# Patient Record
Sex: MALE | Race: WHITE | Employment: OTHER | URBAN - METROPOLITAN AREA
[De-identification: names, ages, dates, MRNs, and addresses within clinical notes are randomized per-mention and may not be internally consistent; named-entity substitution may affect disease eponyms.]

---

## 2019-07-18 ENCOUNTER — HOSPITAL ENCOUNTER (OUTPATIENT)
Dept: WOUND CARE | Age: 84
Discharge: HOME | End: 2019-07-18
Payer: MEDICARE

## 2019-07-18 VITALS
SYSTOLIC BLOOD PRESSURE: 151 MMHG | TEMPERATURE: 97.8 F | RESPIRATION RATE: 16 BRPM | HEART RATE: 62 BPM | DIASTOLIC BLOOD PRESSURE: 73 MMHG

## 2019-07-18 PROBLEM — L22 DIAPER DERMATITIS: Status: ACTIVE | Noted: 2019-07-18

## 2019-07-18 PROBLEM — L89.322 STAGE II PRESSURE ULCER OF LEFT BUTTOCK (HCC): Status: ACTIVE | Noted: 2019-07-18

## 2019-07-18 PROBLEM — L89.312 STAGE II PRESSURE ULCER OF RIGHT BUTTOCK (HCC): Status: ACTIVE | Noted: 2019-07-18

## 2019-07-18 PROCEDURE — 74011000250 HC RX REV CODE- 250: Performed by: OTOLARYNGOLOGY

## 2019-07-18 PROCEDURE — 99215 OFFICE O/P EST HI 40 MIN: CPT

## 2019-07-18 RX ORDER — MIRTAZAPINE 15 MG/1
TABLET, FILM COATED ORAL
COMMUNITY

## 2019-07-18 RX ORDER — OXYCODONE AND ACETAMINOPHEN 7.5; 325 MG/1; MG/1
1 TABLET ORAL
COMMUNITY

## 2019-07-18 RX ORDER — ESCITALOPRAM OXALATE 5 MG/1
TABLET ORAL DAILY
COMMUNITY

## 2019-07-18 RX ORDER — FINASTERIDE 5 MG/1
5 TABLET, FILM COATED ORAL DAILY
COMMUNITY

## 2019-07-18 RX ORDER — MELATONIN
DAILY
COMMUNITY

## 2019-07-18 RX ORDER — LANOLIN ALCOHOL/MO/W.PET/CERES
1000 CREAM (GRAM) TOPICAL DAILY
COMMUNITY

## 2019-07-18 RX ORDER — ASPIRIN 81 MG/1
81 TABLET ORAL DAILY
COMMUNITY

## 2019-07-18 RX ADMIN — Medication: at 11:00

## 2019-07-18 NOTE — WOUND CARE
07/18/19 1120   Wound Buttocks Right   Date First Assessed/Time First Assessed: 07/18/19 1027   Primary Wound Type: Pressure Injury  Location: Buttocks  Wound Location Orientation: Right   Dressing Type Applied Alginate; Foam   Procedure Tolerated Well   Wound Buttocks Left   Date First Assessed/Time First Assessed: 07/18/19 1028   Primary Wound Type: Pressure Injury  Location: Buttocks  Wound Location Orientation: Left   Dressing Type Applied Alginate; Foam   Procedure Tolerated Well   Discharge Condition:stable  Ambulatory Status:wheelchair  Discharge Destination:home  Transportation: facility transport  Accompanied by: self

## 2019-07-18 NOTE — H&P
[address] [place]   HISTORY AND PHYSICAL    Name:  Javy Rodriguez  MR#:  [id number]  :  1926  ACCOUNT #:  [de-identified]  ADMIT DATE:  2019      HISTORY OF PRESENT ILLNESS:  The patient is a 61-year-old male who presents with a 6-week history of bilateral buttocks wounds. Some unknown ointment has been applied followed by a foam dressing. He feels that the wounds have not improved over time. HABITS:  He discontinued cigarettes in . He denies alcohol. ALLERGIES:  HE HAS NO KNOWN DRUG ALLERGIES. CURRENT MEDICATIONS:  Lanoxin, amiodarone, metformin/Glucophage, tamsulosin, levothyroxine, warfarin, Proscar. PAST MEDICAL HISTORY:  Type 2 diabetes, benign prostatic hypertrophy, hypothyroidism. FAMILY HISTORY:  Negative other than for coronary artery heart disease. PHYSICAL EXAMINATION:  GENERAL:  The patient is awake, alert, and conversant. He ambulates with the use of an electrical wheelchair. He spends most of his days in that vehicle. VITAL SIGNS:  His temperature is 97.8, pulse 62, respirations 16, blood pressure 151/73. NEUROLOGIC:  Cranial nerves II through XII grossly intact but does wear bilateral hearing aids. HEAD AND NECK:  Oral cavity, oropharynx, and neck are normal.  LUNGS:  Clear to auscultation and percussion. HEART:  Regular rate and rhythm without murmur. ABDOMEN:  Soft and nontender. BACK:  Nontender to palpation. UPPER EXTREMITIES:  Equal tone and strength bilaterally. LOWER EXTREMITIES:  No lesions noted. Examination of the buttocks reveals a right buttocks wound of 0.5 x 0.5 x 0.1 cm and a left buttocks wound of 0.3 x 0.3 x 0.1 cm. Both wounds are clean. There is no erythema. They are moist, but they are both stage II pressure ulcers.     ASSESSMENT AND PLAN:  I explained to the patient that the biggest cause of his problem is the fact that he sits so long in his electric chair and in his reclining chair and is therefore developing pressure ulcers in this area. In addition, he does have urinary incontinence at times. Therefore, since he has home health on Mondays, Wednesdays, and Fridays, we are going to try to find some dressing that will work that can be changed every few days. We are going to start with Aquacel Ag followed by a bordered foam to be changed Mondays, Wednesdays, and Fridays. I have encouraged the patient to use an air waffle pad with minimal air in it to keep him off of the firm chair surface upon which he has been sitting. It will also be covered with a fabric such as a pillowcase or a towel. I have encouraged him to lie in bed on either his left side, right side, or in a prone position, so that if he does have incontinence, he will not bathe the area of concern in urine. I will see him again in followup in 2 weeks. Hopefully, we can get him healed quickly with this conservative regimen. All questions were answered. His condition on discharge is stable.         Byrno Cervantes MD      [email or web address]  D:  07/18/2019 11:09  T:  07/18/2019 11:17  JOB #:  [id number]  CC:  Zan Toth MD

## 2019-07-18 NOTE — WOUND CARE
07/18/19 1027   Wound Buttocks Right   Date First Assessed/Time First Assessed: 07/18/19 1027   Primary Wound Type: Pressure Injury  Location: Buttocks  Wound Location Orientation: Right   Dressing Status Removed   Dressing Type Foam   Pressure Injury Stage 2   Wound Length (cm) 0.5 cm   Wound Width (cm) 0.5 cm   Wound Depth (cm) 0.1 cm   Wound Volume (cm^3) 0.02 cm^3   Condition of Base Pink   Drainage Amount Scant   Drainage Color Serosanguinous   Wound Odor None   Izzy-wound Assessment Intact   Cleansing and Cleansing Agents  Normal saline   Wound Buttocks Left   Date First Assessed/Time First Assessed: 07/18/19 1028   Primary Wound Type: Pressure Injury  Location: Buttocks  Wound Location Orientation: Left   Dressing Status Removed   Dressing Type Foam   Pressure Injury Stage 2   Wound Length (cm) 0.3 cm   Wound Width (cm) 0.3 cm   Wound Depth (cm) 0.1 cm   Wound Volume (cm^3) 0.01 cm^3   Condition of Base Pink   Drainage Amount Scant   Drainage Color Serosanguinous   Wound Odor None   Izzy-wound Assessment Intact   Cleansing and Cleansing Agents  Normal saline

## 2019-08-08 ENCOUNTER — HOSPITAL ENCOUNTER (OUTPATIENT)
Dept: WOUND CARE | Age: 84
Discharge: HOME | End: 2019-08-08
Payer: MEDICARE

## 2019-08-08 VITALS
HEART RATE: 63 BPM | DIASTOLIC BLOOD PRESSURE: 72 MMHG | TEMPERATURE: 98 F | RESPIRATION RATE: 16 BRPM | SYSTOLIC BLOOD PRESSURE: 150 MMHG

## 2019-08-08 DIAGNOSIS — L22 DIAPER DERMATITIS: ICD-10-CM

## 2019-08-08 DIAGNOSIS — L89.322 STAGE II PRESSURE ULCER OF LEFT BUTTOCK (HCC): ICD-10-CM

## 2019-08-08 DIAGNOSIS — L89.312 STAGE II PRESSURE ULCER OF RIGHT BUTTOCK (HCC): ICD-10-CM

## 2019-08-08 PROCEDURE — 99214 OFFICE O/P EST MOD 30 MIN: CPT

## 2019-08-08 NOTE — DISCHARGE INSTRUCTIONS
Discharge Instructions for  [address] [facility], [address] [place] [address] Avenue  Telephone: [phone]    NAME:  Haroldo Rodriguez  YOB: 1926  MEDICAL RECORD NUMBER:  [id number]  DATE:  8/8/2019    Wound Cleansing:   Do not scrub or use excessive force. Cleanse wound prior to applying a clean dressing with:  [] Normal Saline [] Keep Wound Dry in Shower    [] Wound Cleanser   [] Cleanse wound with Mild Soap & Water  [x] May Shower at Discharge   [] Other:       Topical Treatments:  Do not apply lotions, creams, or ointments to wound bed unless directed. [] Apply moisturizing lotion to skin surrounding the wound prior to dressing change.  [] Apply antifungal ointment to skin surrounding the wound prior to dressing change.  [] Apply thin film of moisture barrier ointment to skin immediately around wound. [x] Other:   Apply desitin ointment to healed wound area 1-2 times per day, dry skin thoroughly before application of cream       Pressure Relief:  [x] When sitting, shift position or do seat lifts every 15 minutes. [x] Wheelchair cushion( Continue to use waffle cushoin covered with towel or pillow case) [] Specialty Bed/Mattress  [x] Turn every 2 hours when in bed. Avoid position directing pressure on wound site. Limit side lying to 30 degree tilt. Limit HOB elevation to 30 degrees. [] Elevate arm/hand above the level of the heart []RightArm []LeftArm     Dietary:  [x] Diet as tolerated: [] Calorie Diabetic Diet: [] No Added Salt:  [x] Increase Protein: [] Other:   Activity:  [x] Activity as tolerated:  [] Patient has no activity restrictions     [] Strict Bedrest: [] Remain off Work:     [] May return to full duty work:                                   [] Return to work with restrictions:             Return Appointment:  [] Wound and dressing supply provider:   [x] ECF or Home Healthcare:  At [address] [place] [address] ( Wound care no longer needed)  [] Wound Assessment: [] Physician or NP scheduled for Wound Assessment:   [x] Return Appointment:As needed  [] Ordered tests:      Electronically signed on 8/8/2019 at 10:48 AM     Leidy Cardona 281: Should you experience any significant changes in your wound(s) or have questions about your wound care, please contact the [address] at [address] [place] [address] 8:00 am - 4:30. If you need help with your wound outside these hours and cannot wait until we are again available, contact your PCP or go to the hospital emergency room. PLEASE NOTE: IF YOU ARE UNABLE TO OBTAIN WOUND SUPPLIES, CONTINUE TO USE THE SUPPLIES YOU HAVE AVAILABLE UNTIL YOU ARE ABLE TO REACH US. IT IS MOST IMPORTANT TO KEEP THE WOUND COVERED AT ALL TIMES.      Physician Signature:_______________________    Date: ___________ Time:  ____________

## 2019-08-08 NOTE — WOUND CARE
08/08/19 1027 Wound Buttocks Right Date First Assessed/Time First Assessed: 07/18/19 1027   Primary Wound Type: Pressure Injury  Location: Buttocks  Wound Location Orientation: Right Dressing Status Removed Dressing Type Foam  
Pressure Injury Stage 3 Wound Length (cm) 0.1 cm Wound Width (cm) 0.1 cm Wound Depth (cm) 0.1 cm Wound Volume (cm^3) 0 cm^3 Condition of Edges Closed Drainage Amount None Wound Odor None Izzy-wound Assessment Red; Intact Cleansing and Cleansing Agents  Normal saline Visit Vitals /72 Pulse 63 Temp 98 °F (36.7 °C) Resp 16

## 2019-08-08 NOTE — PROGRESS NOTES
[address] [place] [address] PROGRESS NOTE    Name:  Isi Moraes  MR#:  [id number]  :  1926  ACCOUNT #:  [de-identified]  DATE OF SERVICE:  2019      SUBJECTIVE:  The patient returns for treatment of diaper dermatitis, L22, with wounds of both buttocks. He was last seen on . We treated him with Aquacel Ag, Bordered Foam, and instructions to purchase an air waffle pad and to cover it with fabric and to offload as well as possible. The patient has had no problems since last seen and denies any changes in medications, allergies, or review of systems. OBJECTIVE:  VITAL SIGNS:  His temperature is 98, pulse 63, respirations 16, blood pressure 150/72. WOUND EXAMINATION:  Examination showed the patient has completely healed all open wounds. ASSESSMENT AND PLAN:  The patient has done beautifully. I showed him that he had too much air in his air waffle pad. I let out enough air so that he was comfortable and was able to sit only half an inch above the surface of his wheelchair. He is going to continue to use the pad and cover it with fabric. He will continue to offload as well as possible. He will start using Desitin at least twice a day, but also as needed after every bowel movement or if the area gets moist from urine. We will simply see him again on a p.r.n. basis and hopefully by keeping everything clean and comfortable, he will stay healed.         MD TAWANDA [email or web address]  D:  2019 11:15  T:  2019 11:23  JOB #:  [id number]

## 2019-08-13 ENCOUNTER — OFFICE VISIT (OUTPATIENT)
Dept: URGENT CARE | Age: 84
End: 2019-08-13

## 2019-08-13 VITALS
RESPIRATION RATE: 20 BRPM | SYSTOLIC BLOOD PRESSURE: 136 MMHG | BODY MASS INDEX: 18.34 KG/M2 | OXYGEN SATURATION: 96 % | WEIGHT: 131 LBS | DIASTOLIC BLOOD PRESSURE: 63 MMHG | HEART RATE: 76 BPM | HEIGHT: 71 IN | TEMPERATURE: 97.7 F

## 2019-08-13 DIAGNOSIS — T14.8XXA MULTIPLE SKIN TEARS: Primary | ICD-10-CM

## 2019-08-13 NOTE — PROGRESS NOTES
Pt got caught in an electric door yesterday while on his scooter and sustained BL forearm skin tears    The history is provided by the patient. Skin Problem   This is a new problem. The current episode started yesterday. The problem occurs constantly. The problem has not changed since onset. Nothing aggravates the symptoms. Nothing relieves the symptoms. Treatments tried: cleaning and dressing.         Past Medical History:   Diagnosis Date    Arrhythmia     a fib    Arthritis     RA    Asthma     's    Autoimmune disease (HCC)     RA    Cancer (HCC)     basal cell on face    Chronic pain     from arthritis    Diabetes (HCC)     Other ill-defined conditions(799.89)     BPH    Pacemaker     Thyroid disease     hypothyroidism        Past Surgical History:   Procedure Laterality Date    CARDIAC SURG PROCEDURE UNLIST      Artificial Aortic Valve    HX APPENDECTOMY      HX CHOLECYSTECTOMY      HX OTHER SURGICAL      hemorrhoidectomy    HX PACEMAKER          HX SMALL BOWEL RESECTION           Family History   Problem Relation Age of Onset    Cancer Mother         colon    Cancer Sister         colon    Cancer Brother     Heart Disease Father         Social History     Socioeconomic History    Marital status: UNKNOWN     Spouse name: Not on file    Number of children: Not on file    Years of education: Not on file    Highest education level: Not on file   Occupational History    Not on file   Social Needs    Financial resource strain: Not on file    Food insecurity:     Worry: Not on file     Inability: Not on file    Transportation needs:     Medical: Not on file     Non-medical: Not on file   Tobacco Use    Smoking status: Former Smoker     Last attempt to quit: 1970     Years since quittin.0    Smokeless tobacco: Former User   Substance and Sexual Activity    Alcohol use: No    Drug use: No    Sexual activity: Not on file   Lifestyle    Physical activity:     Days per week: Not on file     Minutes per session: Not on file    Stress: Not on file   Relationships    Social connections:     Talks on phone: Not on file     Gets together: Not on file     Attends [employer] service: Not on file     Active member of club or organization: Not on file     Attends meetings of clubs or organizations: Not on file     Relationship status: Not on file    Intimate partner violence:     Fear of current or ex partner: Not on file     Emotionally abused: Not on file     Physically abused: Not on file     Forced sexual activity: Not on file   Other Topics Concern     Service Not Asked    Blood Transfusions Not Asked    Caffeine Concern Not Asked    Occupational Exposure Not Asked   Laron Pat Hazards Not Asked    Sleep Concern Not Asked    Stress Concern Not Asked    Weight Concern Not Asked    Special Diet Not Asked    Back Care Not Asked    Exercise Not Asked    Bike Helmet Not Asked   [address] Not Asked    Self-Exams Not Asked   Social History Narrative    Not on file                ALLERGIES: Patient has no known allergies. Review of Systems   Constitutional: Negative. Musculoskeletal: Negative. Skin: Positive for wound. Neurological: Positive for weakness. Negative for numbness. Vitals:    08/13/19 0923   BP: 111/46   Pulse: 81   Resp: 16   Temp: 97.7 °F (36.5 °C)   SpO2: 96%   Weight: 131 lb (59.4 kg)   Height: 5' 11\" (1.803 m)       Physical Exam   Constitutional: He is oriented to person, place, and time. He appears well-developed. No distress. Thin, frail and elderly but alert and cooperative NAD   HENT:   Head: Normocephalic and atraumatic. Mouth/Throat: Oropharynx is clear and moist.   Cardiovascular: Normal rate, regular rhythm and normal heart sounds. Pulmonary/Chest: Breath sounds normal. He is in respiratory distress. He has no wheezes. Neurological: He is alert and oriented to person, place, and time.    Walks with a walker   Skin: Skin is warm and dry. Laceration (skin tears ) noted. He is not diaphoretic. Skin tears BL forearms. 7cm rt FA and 6 cm left FA and then a 1cm. Cleaned with shurclens and water and patted dry and wound edges were secured with steristrips, No debridement needed, some skin edges were shrunk back or missing not allowing for full tear coverage. Pt tolerated the procedure well   Psychiatric: He has a normal mood and affect. His behavior is normal. Judgment and thought content normal.   Nursing note and vitals reviewed. MDM    Wound Repair  Date/Time: 8/13/2019 10:00 AM  Performed by: attendingPreparation: skin prepped with Shur-Clens  Pre-procedure re-eval: Immediately prior to the procedure, the patient was reevaluated and found suitable for the planned procedure and any planned medications. Time out: Immediately prior to the procedure a time out was called to verify the correct patient, procedure, equipment, staff and marking as appropriate. .  Wound length:2.6 - 7.5 cm  Foreign bodies: no foreign bodies  Skin closure: Steri-Strips  Laceration repair approximation: as close as possible. Patient tolerance: Patient tolerated the procedure well with no immediate complications  My total time at bedside, performing this procedure was 1-15 minutes. ICD-10-CM ICD-9-CM    1. Multiple skin tears T14. 8XXA 879.8      No orders of the defined types were placed in this encounter. The patients condition was discussed with the patient and they understand. The patient is to follow up with primary care doctor ,If signs and symptoms become worse the pt is to go to the ER. The patient is to take medications as prescribed.

## 2020-06-25 ENCOUNTER — HOSPITAL ENCOUNTER (OUTPATIENT)
Dept: GENERAL RADIOLOGY | Age: 85
Discharge: HOME | End: 2020-06-25
Payer: MEDICARE

## 2020-06-25 DIAGNOSIS — M47.812 SPONDYLOSIS, CERVICAL: ICD-10-CM

## 2020-06-25 PROCEDURE — 72050 X-RAY EXAM NECK SPINE 4/5VWS: CPT

## 2021-11-08 ENCOUNTER — HOSPITAL ENCOUNTER (OUTPATIENT)
Dept: WOUND CARE | Age: 86
Discharge: HOME | End: 2021-11-08
Payer: MEDICARE

## 2021-11-08 VITALS — TEMPERATURE: 97.7 F

## 2021-11-08 DIAGNOSIS — S51.802A OPEN WOUND OF LEFT FOREARM, INITIAL ENCOUNTER: ICD-10-CM

## 2021-11-08 PROBLEM — L89.322 STAGE II PRESSURE ULCER OF LEFT BUTTOCK (HCC): Status: RESOLVED | Noted: 2019-07-18 | Resolved: 2021-11-08

## 2021-11-08 PROBLEM — L89.312 STAGE II PRESSURE ULCER OF RIGHT BUTTOCK (HCC): Status: RESOLVED | Noted: 2019-07-18 | Resolved: 2021-11-08

## 2021-11-08 PROBLEM — L22 DIAPER DERMATITIS: Status: RESOLVED | Noted: 2019-07-18 | Resolved: 2021-11-08

## 2021-11-08 PROCEDURE — 17250 CHEM CAUT OF GRANLTJ TISSUE: CPT

## 2021-11-08 PROCEDURE — 99203 OFFICE O/P NEW LOW 30 MIN: CPT | Performed by: SURGERY

## 2021-11-08 RX ORDER — FUROSEMIDE 40 MG/1
40 TABLET ORAL DAILY
COMMUNITY

## 2021-11-08 NOTE — WOUND CARE
11/08/21 0944   Wound Arm lower Left #1 11/08/21   Date First Assessed/Time First Assessed: 11/08/21 0944   Present on Hospital Admission: Yes  Wound Approximate Age at First Assessment (Weeks): 2 weeks  Primary Wound Type: Skin Tear  Location: Arm lower  Wound Location Orientation: Left  Wound Descri. ..    Wound Image    Wound Etiology Skin Tear   Dressing Status Old drainage noted   Cleansed Cleansed with saline   Dressing/Treatment   (Telfa, ABD, RG, Ace)   Wound Length (cm) 9.7 cm   Wound Width (cm) 4.9 cm   Wound Depth (cm) 0.1 cm   Wound Surface Area (cm^2) 47.53 cm^2   Wound Volume (cm^3) 4.753 cm^3   Wound Assessment Granulation tissue; Pink/red   Drainage Amount Moderate   Drainage Description Serosanguinous   Wound Odor None   Izzy-Wound/Incision Assessment Intact   Edges Defined edges   Wound Thickness Description Full thickness     Visit Vitals  Temp 97.7 °F (36.5 °C) 5 Hour(s) 1 Minute(s)

## 2021-11-08 NOTE — H&P
[address]  HISTORY AND PHYSICAL    Name:  Winston Villatoro  MR#:  [id number]  :  1926  ACCOUNT #:  [de-identified]  ADMIT DATE:  2021    HISTORY OF PRESENT ILLNESS:  The patient is a 42-year-old man who is referred to the [address] [place] [address] regarding a wound on his left forearm. The patient reports several weeks ago he fell and had a laceration on the left forearm. He went to the ER at that time and then later went to an Urgent [facility]. He has completed a course of antibiotics. The patient lives in an assisted living facility. He is ambulatory but says he always uses a walker because of poor balance. The patient has history of diabetes which is diet controlled. He has history of pacemaker and history of aortic valve replacement. He has no history of coronary artery disease or MI or coronary intervention. The patient denies shortness of breath. He has history of arthritis. He is a former smoker having quit in . He is a [employer] . Reported weight 131 pounds, height 5 feet 11 inches. PHYSICAL EXAMINATION:  VITAL SIGNS:  Temperature 97.7. HEAD AND NECK:  Examination showed no jaundice. LUNGS:  Clear bilaterally without rales, rhonchi or wheezes. HEART:  Regular without murmur, gallop or rub. NEUROLOGIC:  The patient is alert and oriented. He moves all extremities equally. Facial movement is symmetrical.  Speech is normal.  He is hard of hearing. EXTREMITIES:  Examination of the left upper extremity revealed on the left forearm a wound with dimensions 9.7 x 4.9 x 0.1 cm into the SQ layer. The entire surface is clean, slightly exuberant granulation. SKIN:  The patient's skin is relatively thin. I treated the periphery of the exuberant granulation with silver nitrate. Dressing Ordered: Thin Mepilex over wound covered by ABD and roll gauze with elastic sleeve to help hold dressing in place.   Dressing needs to be changed 3 times per week by home health nurses. The patient will follow up in the [address] [place] [address] in 2 weeks. FINAL DIAGNOSIS:  Traumatic wound, left forearm.       Emile Caicedo MD      [email or web address]  D:  11/08/2021 10:28  T:  11/08/2021 11:43  JOB #:  [id number]

## 2021-11-08 NOTE — PROGRESS NOTES
Face to Face Documentation for [address] Name: Prakash Penn    YOB: 1926    Date of Face to Face:  11/8/2021                                    Face to Face Encounter findings are related to primary reason for home care:   yes    I certify that this patient is under my care and has a Face to Face Encounter related to the primary reason for home health. 1. I certify that the patient needs intermittent skilled nursing care consisting of wound care (Thin Mepilex over wound left forearm, covered by ABD, roll gauze; apply elastic sleeve to forearm. Dressing to be changed 3 times per week by Home Health nurse. ). 2. I certify that this patient is homebound for the following reason(s): Requires considerable and taxing effort to leave the home , Requires the assistance of 1 or more persons to leave the home  and Only leaves the home for medical reasons or Taoist services and are infrequent and of short duration for other reasons     3.  The qualifying diagnosis is open wound left forearm (ICD 10   S51.802A)        Fab Gallagher MD 11/8/2021 10:28 AM

## 2021-11-08 NOTE — DISCHARGE INSTRUCTIONS
Discharge Instructions/Wound Orders  [address], [address] [place] [address] Nelda Seals, [id number]  Telephone: [phone]    NAME:  Jocelyn Fitzpatrick  YOB: 1926  MEDICAL RECORD NUMBER:  [id number]  DATE:  11/8/2021  Wound Care Orders:  Left forearm wound - cleanse with saline, pat dry, apply Mepilex lite nonadherent dressing (I.e. no border), cover with ABD, secure with roll gauze, apply stockinette sleeve. Change 3 x week. Follow-up with MD in 2 weeks. Home Health skilled nursing for wound care as noted above. Dietary:  [x] Diet as tolerated: [] Calorie Diabetic Diet:Low carb and no Sugar [] No Added Salt:[x] Increase Protein: [] Other:Limit the amount of liquid you are drinking and avoid drinking in between meals   Activity:  [x] Activity as tolerated:  [] Patient has no activity restrictions     [] Strict Bedrest: [] Remain off Work:     [] May return to full duty work:                                   [] Return to work with restrictions:             Return Appointment:  [x] Return Appointment: With DR Ty Mariee  in  2 Week(s)  [] Ordered tests:    Electronically signed iSria Mejia RN on 11/8/2021 at 9:56 AM     Leidy Cardona 281: Should you experience any significant changes in your wound(s) or have questions about your wound care, please contact the [address] [place] [address] at [address] [place] [address] 8:00 am - 4:30. If you need help with your wound outside these hours and cannot wait until we are again available, contact your PCP or go to the hospital emergency room. PLEASE NOTE: IF YOU ARE UNABLE TO OBTAIN WOUND SUPPLIES, CONTINUE TO USE THE SUPPLIES YOU HAVE AVAILABLE UNTIL YOU ARE ABLE TO REACH US. IT IS MOST IMPORTANT TO KEEP THE WOUND COVERED AT ALL TIMES.      Physician Signature:_______________________    Date: ___________ Time:  ____________

## 2021-11-22 ENCOUNTER — HOSPITAL ENCOUNTER (OUTPATIENT)
Dept: WOUND CARE | Age: 86
Discharge: HOME | End: 2021-11-22
Payer: MEDICARE

## 2021-11-22 VITALS — TEMPERATURE: 97.4 F | RESPIRATION RATE: 18 BRPM

## 2021-11-22 DIAGNOSIS — S51.802D OPEN WOUND OF LEFT FOREARM, SUBSEQUENT ENCOUNTER: ICD-10-CM

## 2021-11-22 PROCEDURE — 99213 OFFICE O/P EST LOW 20 MIN: CPT | Performed by: SURGERY

## 2021-11-22 PROCEDURE — 99213 OFFICE O/P EST LOW 20 MIN: CPT

## 2021-11-22 NOTE — PROGRESS NOTES
HISTORY OF PRESENT ILLNESS:  The patient is a 80-year-old man who is referred to the [address] [place] [address] regarding a wound on his left forearm. The patient reports several weeks ago he fell and had a laceration on the left forearm. He went to the ER at that time and then later went to an Urgent [facility]. He has completed a course of antibiotics. The patient was first seen at the [address] [place] [address] on 11/8/2021.     The patient lives in an assisted living facility. He is ambulatory but says he always uses a walker because of poor balance.     The patient has history of diabetes which is diet controlled. He has history of pacemaker and history of aortic valve replacement. He has no history of coronary artery disease or MI or coronary intervention.     The patient denies shortness of breath. He has history of arthritis. He is a former smoker having quit in 1970. He is a [employer] . Dressing ordered 11/8/2021: Thin Mepilex over wound covered by ABD and roll gauze with elastic sleeve to help hold dressing in place. Dressing needs to be changed 3 times per week by home health nurses. Home Health has been using a Xeroform contact layer.         Reported weight 131 pounds, height 5 feet 11 inches.     PHYSICAL EXAMINATION:    Alert elderly man, NAD. EXTREMITIES:  Examination of the left upper extremity revealed on the left forearm a wound with dimensions 7.7 x 3.5 x 0.1 cm into the SQ layer. The entire surface is clean, slightly exuberant granulation. The patient's skin is relatively thin.       I treated the periphery of the exuberant granulation with silver nitrate.       The wound has decreased in size. Dressing Ordered: Thin Mepilex over wound covered by ABD and roll gauze with elastic sleeve to help hold dressing in place.   Dressing needs to be changed 3 times per week by home health nurses.     The patient will follow up in the [address] [place] [address] in 2 weeks.     FINAL DIAGNOSIS:  Traumatic wound, left forearm.       W20.799P        Rell Stephens MD

## 2021-11-22 NOTE — DISCHARGE INSTRUCTIONS
Discharge Instructions/Wound Orders  [address], [address] [place] [address] Nelda Seals, [id number]  Telephone: [phone]    NAME:  Jocelyn Fitzpatrick  YOB: 1926  MEDICAL RECORD NUMBER:  [id number]  DATE:  11/22/2021  Wound Care Orders:    Left forearm wound - cleanse with saline, pat dry, apply Mepilex lite nonadherent dressing (I.e. no border), cover with ABD, secure with roll gauze, apply stockinette sleeve. Change 3 x week. Follow-up with MD in 2 weeks. Home Health skilled nursing for wound care as noted above. Dietary:  [x] Diet as tolerated: [] Calorie Diabetic Diet:Low carb and no Sugar [] No Added Salt:[] Increase Protein: [] Other:Limit the amount of liquid you are drinking and avoid drinking in between meals   Activity:  [x] Activity as tolerated:  [] Patient has no activity restrictions     [] Strict Bedrest: [] Remain off Work:     [] May return to full duty work:                                   [] Return to work with restrictions:             Return Appointment:   [x] Return Appointment: With DR Ty Mariee  in  2   Week(s)  [] Ordered tests:    Electronically signed Newton Echols on 11/22/2021 at 10:08 AM     Leidy Cardona 281: Should you experience any significant changes in your wound(s) or have questions about your wound care, please contact the [address] [place] [address] at [address] [place] [address] 8:00 am - 4:30. If you need help with your wound outside these hours and cannot wait until we are again available, contact your PCP or go to the hospital emergency room. PLEASE NOTE: IF YOU ARE UNABLE TO OBTAIN WOUND SUPPLIES, CONTINUE TO USE THE SUPPLIES YOU HAVE AVAILABLE UNTIL YOU ARE ABLE TO REACH US. IT IS MOST IMPORTANT TO KEEP THE WOUND COVERED AT ALL TIMES.      Physician Signature:_______________________    Date: ___________ Time:  ____________

## 2021-11-22 NOTE — WOUND CARE
11/22/21 0951   Wound Arm lower Left #1 11/08/21   Date First Assessed/Time First Assessed: 11/08/21 0944   Present on Hospital Admission: Yes  Wound Approximate Age at First Assessment (Weeks): 2 weeks  Primary Wound Type: Skin Tear  Location: Arm lower  Wound Location Orientation: Left  Wound Descri. .. Wound Image    Wound Etiology Skin Tear   Dressing Status Old drainage noted; Intact  (Removed Xeroform;gauze;ABD; roll gauze; tape)   Cleansed Cleansed with saline   Wound Length (cm) 7.7 cm   Wound Width (cm) 3.5 cm   Wound Depth (cm) 0.1 cm   Wound Surface Area (cm^2) 26.95 cm^2   Change in Wound Size % 43.3   Wound Volume (cm^3) 2.695 cm^3   Wound Healing % 43   Wound Assessment Pink/red; Granulation tissue   Drainage Amount Moderate   Drainage Description Serosanguinous   Wound Odor None   Izzy-Wound/Incision Assessment Intact   Edges Defined edges   Wound Thickness Description Full thickness   There were no vitals taken for this visit.

## 2021-12-06 ENCOUNTER — HOSPITAL ENCOUNTER (OUTPATIENT)
Dept: WOUND CARE | Age: 86
Discharge: HOME | End: 2021-12-06
Payer: MEDICARE

## 2021-12-06 VITALS
DIASTOLIC BLOOD PRESSURE: 72 MMHG | TEMPERATURE: 98 F | SYSTOLIC BLOOD PRESSURE: 164 MMHG | HEART RATE: 62 BPM | RESPIRATION RATE: 16 BRPM

## 2021-12-06 DIAGNOSIS — S41.102A OPEN WOUND OF LEFT UPPER ARM, INITIAL ENCOUNTER: ICD-10-CM

## 2021-12-06 DIAGNOSIS — S91.302A WOUND OF LEFT FOOT: ICD-10-CM

## 2021-12-06 DIAGNOSIS — S51.802D OPEN WOUND OF LEFT FOREARM, SUBSEQUENT ENCOUNTER: ICD-10-CM

## 2021-12-06 PROCEDURE — 99214 OFFICE O/P EST MOD 30 MIN: CPT | Performed by: SURGERY

## 2021-12-06 PROCEDURE — 99213 OFFICE O/P EST LOW 20 MIN: CPT

## 2021-12-06 RX ORDER — DOXYCYCLINE 100 MG/1
100 CAPSULE ORAL 2 TIMES DAILY
Qty: 20 CAPSULE | Refills: 0 | Status: SHIPPED | OUTPATIENT
Start: 2021-12-06 | End: 2021-12-16

## 2021-12-06 NOTE — WOUND CARE
12/06/21 0943   Wound Arm upper Left # 2 12/06/21   Date First Assessed/Time First Assessed: 12/06/21 0947   Present on Hospital Admission: Yes  Wound Approximate Age at First Assessment (Weeks): 1 weeks  Primary Wound Type: (c) Skin Tear  Location: Arm upper  Wound Location Orientation: Left  Wound De. .. Wound Image    Wound Etiology Traumatic   Wound Length (cm) 8.5 cm   Wound Width (cm) 4.5 cm   Wound Depth (cm) 0.1 cm   Wound Surface Area (cm^2) 38.25 cm^2   Wound Volume (cm^3) 3.825 cm^3   Wound Assessment Pink/red  (Blood blister and Skin tear)   Drainage Amount Moderate   Drainage Description Serosanguinous   Wound Odor None   Izzy-Wound/Incision Assessment Ecchymosis   Edges Flat/open edges   Wound Thickness Description Full thickness   Wound Arm lower Left #1 11/08/21   Date First Assessed/Time First Assessed: 11/08/21 0944   Present on Hospital Admission: Yes  Wound Approximate Age at First Assessment (Weeks): 2 weeks  Primary Wound Type: Skin Tear  Location: Arm lower  Wound Location Orientation: Left  Wound Descri. .. Wound Image    Wound Etiology Skin Tear   Cleansed Cleansed with saline   Wound Length (cm) 6.4 cm   Wound Width (cm) 2 cm   Wound Depth (cm) 0.1 cm   Wound Surface Area (cm^2) 12.8 cm^2   Change in Wound Size % 73.07   Wound Volume (cm^3) 1.28 cm^3   Wound Healing % 73   Wound Assessment Pink/red; Granulation tissue   Drainage Amount Moderate   Drainage Description Serosanguinous   Wound Odor None   Izzy-Wound/Incision Assessment Intact   Edges Flat/open edges   Wound Thickness Description Full thickness   Wound Foot Dorsal; Left # 3 12/06/21   Date First Assessed/Time First Assessed: 12/06/21 0951   Present on Hospital Admission: Yes  Wound Approximate Age at First Assessment (Weeks): 1 weeks  Primary Wound Type: Traumatic  Location: Foot  Wound Location Orientation: Dorsal; Left  Wound Elder. ..    Wound Image    Wound Etiology Traumatic   Cleansed Cleansed with saline   Wound Length (cm) 0.7 cm   Wound Width (cm) 1.5 cm   Wound Depth (cm) 0.1 cm   Wound Surface Area (cm^2) 1.05 cm^2   Wound Volume (cm^3) 0.105 cm^3   Wound Assessment Pink/red   Drainage Amount Moderate   Drainage Description Serosanguinous   Wound Odor None   Izzy-Wound/Incision Assessment Non-Blanchable erythema   Edges Flat/open edges   Wound Thickness Description Full thickness   Pain 1   Pain Scale 1 Numeric (0 - 10)   Pain Intensity 1 0  (hurt some)   Patient Stated Pain Goal 0   Pain Reassessment 1 Yes     Visit Vitals  BP (!) 164/72   Pulse 62   Temp 98 °F (36.7 °C)   Resp 16

## 2021-12-06 NOTE — PROGRESS NOTES
HISTORY OF PRESENT ILLNESS:  The patient is a 12-year-old man who is referred to the [address] [place] [address] regarding a wound on his left forearm.  The patient reports several weeks ago he fell and had a laceration on the left forearm.  He went to the ER at that time and then later went to an [facility] has completed a course of antibiotics.     The patient was first seen at the [address] [place] [address] on 11/8/2021. He fell again last week, injuring left upper arm. He also later dropped an iPad on his left foot.     The patient lives in an  independent living facility. Justin Sinha is ambulatory but says he always uses a walker because of poor balance.     The patient has history of diabetes which is diet controlled. Justin Sinha has history of pacemaker and history of aortic valve replacement.  He has no history of coronary artery disease or MI or coronary intervention.     The patient denies shortness of breath.  He has history of arthritis. Justin Sinha is a former smoker having quit in [address] is a [employer] .     Dressing ordered 11/8/2021: Thin Mepilex over wound covered by ABD and roll gauze with elastic sleeve to help hold dressing in place.  Dressing needs to be changed 3 times per week by home health nurses.  [address] [place] [address] has been using a Xeroform contact layer. Patient also reports some penile swelling. Is able to urinate.           Reported weight 131 pounds, height 5 feet 11 inches.     PHYSICAL EXAMINATION:     Alert elderly man, NAD.     EXTREMITIES:  Examination of the left upper extremity revealed on the left forearm a wound with dimensions 6.4 x 2 x 0.1 cm into the SQ layer.  The entire surface is clean granulation. The patient's skin is relatively thin.      Left upper medial arm - avulsion of skin with wound 8.5 x 4.5 x 0.1 cm. Oozing sites treated with Silver nitrate. Left foot - palpable left DP pulse. Trace to 1 + edema left lower leg and foot.   Wound on distal dorsal foot  Near base of second toe 0.7 x 1.5 x 0.1 cm with pink surface. Local erythema. Left foot wound cultured.          New traumatic wounds. I asked patient to speak to son about patient moving from independent living to assisted living. I asked patient to get us son's (Luis Vanegas), contact number.        Dressing Ordered:  Xeroform over all wounds, covered by ABD and roll gauze with elastic sleeve to help hold dressing in place.  Dressing needs to be changed 3 times per week by home health nurses. I asked patient to see his primary MD today or tomorrow regarding swelling of penis. For foot wound with erythema, I have ordered emperical Doxycycline 100 bid.     The patient will follow up in the [address] [place] [address] in 1 week.     FINAL DIAGNOSIS:  Traumatic wound, left forearm; traumatic wound left upper arm; traumatic wound left dorsal foot.   Poor balance.        S51.802D, S41.102A, S91.302A        Emile Caicedo MD

## 2021-12-06 NOTE — DISCHARGE INSTRUCTIONS
Discharge Instructions/Wound Orders  [address], [address] [place] [address], [id number]  Telephone: [phone]    NAME:  Isabel Xavier  YOB: 1926  MEDICAL RECORD NUMBER:  [id number]  DATE:  12/6/2021  Wound Care Orders: All open wound - cleanse with saline, pat dry, apply xeroform, cover with ABD, secure with roll gauze, apply stockinette sleeve. Change 3 x week. Follow-up with MD in 1 weeks. Cultures send from left dorsal foot. Home Health skilled nursing for wound care as noted above    Dietary:  [x] Diet as tolerated: [] Calorie Diabetic Diet:Low carb and no Sugar [x] No Added Salt:[] Increase Protein: [] Other:Limit the amount of liquid you are drinking and avoid drinking in between meals   Activity:  [x] Activity as tolerated:  [] Patient has no activity restrictions     [] Strict Bedrest: [] Remain off Work:     [] May return to full duty work:                                   [] Return to work with restrictions:             Return Appointment:   [x] Return Appointment: With DR Moses Brewster  in  1 Week(s)  [] Ordered tests:    Electronically signed Hali Hughes RN on 12/6/2021 at 10:14 AM     Leidy Cardona 281: Should you experience any significant changes in your wound(s) or have questions about your wound care, please contact the [address] [place] [address] at [address] [place] [address] 8:00 am - 4:30. If you need help with your wound outside these hours and cannot wait until we are again available, contact your PCP or go to the hospital emergency room. PLEASE NOTE: IF YOU ARE UNABLE TO OBTAIN WOUND SUPPLIES, CONTINUE TO USE THE SUPPLIES YOU HAVE AVAILABLE UNTIL YOU ARE ABLE TO REACH US. IT IS MOST IMPORTANT TO KEEP THE WOUND COVERED AT ALL TIMES.      Physician Signature:_______________________    Date: ___________ Time:  ____________

## 2021-12-06 NOTE — WOUND CARE
12/06/21 1033   Wound Arm upper Left # 2 12/06/21   Date First Assessed/Time First Assessed: 12/06/21 0947   Present on Hospital Admission: Yes  Wound Approximate Age at First Assessment (Weeks): 1 weeks  Primary Wound Type: (c) Skin Tear  Location: Arm upper  Wound Location Orientation: Left  Wound De. .. Dressing Status New dressing applied   Cleansed Cleansed with saline   Dressing/Treatment Xeroform; ABD pad; Roll gauze; Tape/Soft cloth adhesive tape   Wound Arm lower Left #1 11/08/21   Date First Assessed/Time First Assessed: 11/08/21 0944   Present on Hospital Admission: Yes  Wound Approximate Age at First Assessment (Weeks): 2 weeks  Primary Wound Type: Skin Tear  Location: Arm lower  Wound Location Orientation: Left  Wound Descri. .. Dressing Status New dressing applied   Cleansed Cleansed with saline   Dressing/Treatment Xeroform; ABD pad; Roll gauze; Tape/Soft cloth adhesive tape   Wound Foot Dorsal; Left # 3 12/06/21   Date First Assessed/Time First Assessed: 12/06/21 0951   Present on Hospital Admission: Yes  Wound Approximate Age at First Assessment (Weeks): 1 weeks  Primary Wound Type: Traumatic  Location: Foot  Wound Location Orientation: Dorsal; Left  Wound Elder. ..    Dressing Status New dressing applied   Cleansed Cleansed with saline   Dressing/Treatment Xeroform; ABD pad; Roll gauze; Tape/Soft cloth adhesive tape

## 2021-12-08 ENCOUNTER — DOCUMENTATION ONLY (OUTPATIENT)
Dept: SURGERY | Age: 86
End: 2021-12-08

## 2021-12-08 NOTE — PROGRESS NOTES
Wound Care    I discussed with patient's son, Dickson Fraser., my concerns about patient's ability to continue living in independent living. The patient has had multiple falls. The son will observe his father tomorrow and will discuss with PT / OT his father's safety issues. They will discuss transition to a higher level of care. The patient has voluntarily given up his car and driving.     Corine Wallace MD

## 2021-12-12 LAB — MICROORGANISM/AGENT SPEC: ABNORMAL

## 2021-12-13 ENCOUNTER — HOSPITAL ENCOUNTER (OUTPATIENT)
Dept: WOUND CARE | Age: 86
Discharge: HOME | End: 2021-12-13
Payer: MEDICARE

## 2021-12-13 ENCOUNTER — DOCUMENTATION ONLY (OUTPATIENT)
Dept: SURGERY | Age: 86
End: 2021-12-13

## 2021-12-13 VITALS
TEMPERATURE: 99 F | SYSTOLIC BLOOD PRESSURE: 189 MMHG | DIASTOLIC BLOOD PRESSURE: 83 MMHG | HEART RATE: 62 BPM | RESPIRATION RATE: 16 BRPM

## 2021-12-13 DIAGNOSIS — S91.302A WOUND OF LEFT FOOT: ICD-10-CM

## 2021-12-13 DIAGNOSIS — L03.116 CELLULITIS OF LEFT FOOT: ICD-10-CM

## 2021-12-13 DIAGNOSIS — S51.802D OPEN WOUND OF LEFT FOREARM, SUBSEQUENT ENCOUNTER: ICD-10-CM

## 2021-12-13 DIAGNOSIS — S41.102D OPEN WOUND OF LEFT UPPER ARM, SUBSEQUENT ENCOUNTER: ICD-10-CM

## 2021-12-13 PROCEDURE — 99214 OFFICE O/P EST MOD 30 MIN: CPT | Performed by: SURGERY

## 2021-12-13 PROCEDURE — 99213 OFFICE O/P EST LOW 20 MIN: CPT

## 2021-12-13 NOTE — PROGRESS NOTES
HISTORY OF PRESENT ILLNESS:  The patient is a 66-year-old man who is referred to the [address] [place] [address] Road regarding a wound on his left forearm.  The patient reports several weeks ago he fell and had a laceration on the left forearm.  He went to the ER at that time and then later went to an [facility] has completed a course of antibiotics.     The patient was first seen at [facility] on 11/8/[facility]21.     He fell again last week, injuring left upper arm. He also later dropped an iPad on his left foot.     The patient lives in an  independent living facility. Lui Cuenca is ambulatory but says he always uses a walker because of poor balance. On 12/8/2021, I discussed with patient's son, Dawit Henderson., my concerns about patient's ability to continue living in independent living. The patient has had multiple falls. The son will observe his father tomorrow and will discuss with PT / OT his father's safety issues. They will discuss transition to a higher level of care. The patient has voluntarily given up his car and driving.     The patient has history of diabetes which is diet controlled. Lui Cuenca has history of pacemaker and history of aortic valve replacement.  He has no history of coronary artery disease or MI or coronary intervention. He takes lasix 40 mg daily, but hasn't taken for a week. Drinks extra water during the day.     The patient denies shortness of breath.  He has history of arthritis. [facility] is a [facility] [place] [facility] [employer] . Culture of 12/6/2021 grew MSSA, sens to TCN. Patient started emperic DCN on 12/6/2021.     Dressing ordered 11/8/2021:  Thin Mepilex over wound covered by ABD and roll gauze with elastic sleeve to help hold dressing in place.  Dressing needs to be changed 3 times per week by home health nurses.     Dressings as of 12/6/2021:  Xeroform over all wounds, covered by ABD and roll gauze with elastic sleeve to help hold dressing in place.  Dressing needs to be changed 3 times per week by home health nurses.     Patient had penile swelling; started treatment for yeast.           Reported weight 131 pounds, height 5 feet 11 inches.     PHYSICAL EXAMINATION:     Alert elderly man, NAD.     EXTREMITIES:  Examination of the left upper extremity revealed on the left forearm a wound with dimensions 1.5 x 0.8 x 0.1 cm into the SQ layer.  The entire surface is clean granulation.  The patient's skin is relatively thin.      Left upper medial arm - avulsion of skin with wound 4 x 4 x 0.1 cm. Clean granulation.     Left foot - palpable left DP pulse. Trace to 1 + edema left lower leg and foot. Wound on distal dorsal foot  Near base of second toe 0.5 x 0.6  x 0.1 cm with pink surface. Local erythema slightly reduced.              Wounds improving. Left foot cellulitis a little better. Bilateral leg edema.              Dressing Ordered:  Xeroform over all wounds, covered by ABD and roll gauze with elastic sleeve to help hold dressing in place.  Dressing needs to be changed 3 times per week by home health nurses.     Complete Doxycycline 100 bid.     Resume lasix 40 mg daily. Reduce extra water intake. The patient will follow up in the [address] [place] [address] in 1 week.     FINAL DIAGNOSIS:  Traumatic wound, left forearm; traumatic wound left upper arm; traumatic wound left dorsal foot. Poor balance . Bilateral leg edema. Cellulitis left foot.        S51.802D, S41.102D, S91.302D, R60.0, L03. [address] [facility]  Willow Johnson MD

## 2021-12-13 NOTE — PROGRESS NOTES
[facility]    Wound culture of 12/6/2021 grew staph aureus (MSSA). The patient was started empericaly on  Doxycycline 100 mg po bid for 14 days. The MSSA is sensitive to TCN.     Tana Fish MD

## 2021-12-13 NOTE — DISCHARGE INSTRUCTIONS
Discharge Instructions/Wound [facility]  [address]  [place], [address] [facility]  Telephone: [phone]    NAME:  Isabel Xavier  YOB: 1926  MEDICAL RECORD NUMBER:  [id number]  DATE:  12/13/2021      WOUND CARE ORDERS:    Left upper arm wound and left forearm wound Left dorsal foot wound      Clean with normal saline, pat dry. Apply xeroform, gauze, or abd to wounds, rolled gauze, stockenette to left arm to keep dressings clean and intact. Try to limit water and fluids you drink during the day. .you do not need to take in extra fluids. .  Take fluid pill every day    Return to see MD in 1 week      TREATMENT ORDERS:    Elevate leg(s) above the level of the heart when sitting. Avoid prolonged standing in one place. Do no get dressing/wrap wet. Follow Diet as prescribed:   [] Diet as tolerated: [] Calorie Diabetic Diet: [] No Added Salt:  [] Increase Protein: [] Other:                 Return Appointment:  [x] Return Appointment: With Dr Alessio Forebs in 1 week      [] Ordered tests:      Electronically signed Ignacio Dao RN on 12/13/2021 at 10:40 AM     [address] [place] [address] Information: Should you experience any significant changes in your wound(s) or have questions about your wound care, please contact the [address] [place] [address] at [address] [place] [address] 8:00 am - 4:30. If you need help with your wound outside these hours and cannot wait until we are again available, contact your PCP or go to the hospital emergency room. PLEASE NOTE: IF YOU ARE UNABLE TO OBTAIN WOUND SUPPLIES, CONTINUE TO USE THE SUPPLIES YOU HAVE AVAILABLE UNTIL YOU ARE ABLE TO REACH US. IT IS MOST IMPORTANT TO KEEP THE WOUND COVERED AT ALL TIMES.      Physician Signature:_______________________    Date: ___________ Time:  ____________

## 2021-12-13 NOTE — WOUND CARE
12/13/21 0947   Wound Arm upper Left # 2 12/06/21   Date First Assessed/Time First Assessed: 12/06/21 0947   Present on Hospital Admission: Yes  Wound Approximate Age at First Assessment (Weeks): 1 weeks  Primary Wound Type: (c) Skin Tear  Location: Arm upper  Wound Location Orientation: Left  Wound De. .. Wound Image    Wound Etiology Traumatic   Dressing Status Old drainage noted   Cleansed Cleansed with saline   Wound Length (cm) 4 cm   Wound Width (cm) 4 cm   Wound Depth (cm) 0.1 cm   Wound Surface Area (cm^2) 16 cm^2   Change in Wound Size % 58.17   Wound Volume (cm^3) 1.6 cm^3   Wound Healing % 58   Wound Assessment Pink/red   Drainage Amount Moderate   Drainage Description Serosanguinous   Wound Odor None   Izzy-Wound/Incision Assessment Fragile   Edges Undefined edges   Wound Arm lower Left #1 11/08/21   Date First Assessed/Time First Assessed: 11/08/21 0944   Present on Hospital Admission: Yes  Wound Approximate Age at First Assessment (Weeks): 2 weeks  Primary Wound Type: Skin Tear  Location: Arm lower  Wound Location Orientation: Left  Wound Descri. .. Wound Image    Wound Etiology Skin Tear   Dressing Status Old drainage noted   Cleansed Cleansed with saline   Wound Length (cm) 1.5 cm   Wound Width (cm) 0.8 cm   Wound Depth (cm) 0.1 cm   Wound Surface Area (cm^2) 1.2 cm^2   Change in Wound Size % 97.48   Wound Volume (cm^3) 0.12 cm^3   Wound Healing % 97   Wound Assessment Pink/red; Granulation tissue   Drainage Amount Small   Drainage Description Serosanguinous   Wound Odor None   Izzy-Wound/Incision Assessment Intact   Wound Thickness Description Full thickness   Wound Foot Dorsal; Left # 3 12/06/21   Date First Assessed/Time First Assessed: 12/06/21 0951   Present on Hospital Admission: Yes  Wound Approximate Age at First Assessment (Weeks): 1 weeks  Primary Wound Type: Traumatic  Location: Foot  Wound Location Orientation: Dorsal; Left  Wound Elder. ..    Wound Image    Dressing Status Old drainage noted   Cleansed Cleansed with saline   Wound Length (cm) 0.5 cm   Wound Width (cm) 0.6 cm   Wound Depth (cm) 0.2 cm   Wound Surface Area (cm^2) 0.3 cm^2   Change in Wound Size % 71.43   Wound Volume (cm^3) 0.06 cm^3   Wound Healing % 43   Wound Assessment Pink/red   Drainage Amount Small   Drainage Description Serous   Izzy-Wound/Incision Assessment Non-Blanchable erythema   Edges Flat/open edges   Wound Thickness Description Full thickness     Visit Vitals  BP (!) 189/83   Pulse 62   Temp 99 °F (37.2 °C)   Resp 16

## 2021-12-20 ENCOUNTER — HOSPITAL ENCOUNTER (OUTPATIENT)
Dept: WOUND CARE | Age: 86
Discharge: HOME | End: 2021-12-20
Payer: MEDICARE

## 2021-12-20 VITALS
RESPIRATION RATE: 16 BRPM | HEART RATE: 62 BPM | DIASTOLIC BLOOD PRESSURE: 61 MMHG | SYSTOLIC BLOOD PRESSURE: 134 MMHG | TEMPERATURE: 97.7 F

## 2021-12-20 DIAGNOSIS — S91.302A WOUND OF LEFT FOOT: ICD-10-CM

## 2021-12-20 DIAGNOSIS — S51.802D OPEN WOUND OF LEFT FOREARM, SUBSEQUENT ENCOUNTER: ICD-10-CM

## 2021-12-20 DIAGNOSIS — S41.102D OPEN WOUND OF LEFT UPPER ARM, SUBSEQUENT ENCOUNTER: ICD-10-CM

## 2021-12-20 DIAGNOSIS — L03.116 CELLULITIS OF LEFT FOOT: ICD-10-CM

## 2021-12-20 PROCEDURE — 99213 OFFICE O/P EST LOW 20 MIN: CPT

## 2021-12-20 PROCEDURE — 99213 OFFICE O/P EST LOW 20 MIN: CPT | Performed by: SURGERY

## 2021-12-20 NOTE — PROGRESS NOTES
HISTORY OF PRESENT ILLNESS:  The patient is a 70-year-old man who is referred to the [address] [place] [address] Road regarding a wound on his left forearm.  The patient reports several weeks ago he fell and had a laceration on the left forearm.  He went to the ER at that time and then later went to an [facility] has completed a course of antibiotics.     The patient was first seen at [facility] on 11/8/2021.     He fell again last week, injuring left upper arm.  He also later dropped an iPad on his left foot.     The patient lives in [address] [place] [address] is ambulatory but says he always uses a walker because of poor balance.     On 12/8/2021, I discussed with patient's son, Kaylee Gregory., my concerns about patient's ability to continue living in independent living.  The patient has had multiple falls. The son will observe his father tomorrow and will discuss with PT / OT his father's safety issues. Carol Youngblood will discuss transition to a higher level of care. The patient has voluntarily given up his car and driving.     The patient has history of diabetes which is diet controlled. [facility] has history of pacemaker and history of aortic valve replacement.  He has no history of coronary artery disease or MI or coronary intervention.     He takes lasix 40 mg daily, but hasn't taken for a week. Drinks extra water during the day.     The patient denies shortness of breath.  He has history of arthritis. [facility] is a former smoker having quit in [address] is a [employer] .     Culture of 12/6/2021 grew MSSA, sens to TCN.   Patient started emperic DCN on 12/6/2021.     Dressing ordered 11/8/2021:  Thin Mepilex over wound covered by ABD and roll gauze with elastic sleeve to help hold dressing in place.  Dressing needs to be changed 3 times per week by home health nurses.     Dressings as of 12/6/2021:  Xeroform over all wounds, covered by ABD and roll gauze with elastic sleeve to help hold dressing in place.  Dressing needs to be changed 3 times per week by home health nurses.              Reported weight 131 pounds, height 5 feet 11 inches.     PHYSICAL EXAMINATION:     Alert elderly man, NAD.     EXTREMITIES:  Examination of the left upper extremity revealed on the left forearm a wound with dimensions 1 x 0.2 x 0.1 cm into the SQ layer.  The entire surface is clean granulation.  The patient's skin is relatively thin.      Left upper medial arm - avulsion of skin with wound 0.1 x 0.1 x 0.1 cm.  Clean granulation.     Left foot - palpable left DP pulse.  Trace to 1 + edema left lower leg and foot.  Wound on distal dorsal foot  Near base of second toe 0.2 x 0.2 x 0.1 cm with pink surface.  Local erythema  reduced.               Wounds improving. Left foot cellulitis  better. Bilateral leg edema, mild.                 Dressing Ordered:  Xeroform over all wounds, covered by ABD and roll gauze with elastic sleeve to help hold dressing in place. Tape and dressing on foot.  Dressing needs to be changed 3 times per week by home health nurses.     Resume lasix 40 mg daily. Reduce extra water intake.     The patient will follow up in the [address] [place] [address] in 2 weeks.     FINAL DIAGNOSIS:  Traumatic wound, left forearm; traumatic wound left upper arm; traumatic wound left dorsal foot.  Poor balance . Bilateral leg edema. Cellulitis left foot.        S51.802D, S41.102D, S91.302D, R60.0, L03. [address] [facility]  Martin Morley MD

## 2021-12-20 NOTE — WOUND CARE
12/20/21 1057   Wound Arm upper Left # 2 12/06/21   Date First Assessed/Time First Assessed: 12/06/21 0947   Present on Hospital Admission: Yes  Wound Approximate Age at First Assessment (Weeks): 1 weeks  Primary Wound Type: (c) Skin Tear  Location: Arm upper  Wound Location Orientation: Left  Wound De. .. Wound Image    Wound Etiology Traumatic   Dressing Status Intact   Cleansed Cleansed with saline   Wound Length (cm) 0.1 cm   Wound Width (cm) 0.1 cm   Wound Depth (cm) 0.1 cm   Wound Surface Area (cm^2) 0.01 cm^2   Change in Wound Size % 99.97   Wound Volume (cm^3) 0.001 cm^3   Wound Healing % 100   Drainage Amount Scant   Drainage Description Serous   Wound Odor None   Wound Foot Dorsal; Left # 3 12/06/21   Date First Assessed/Time First Assessed: 12/06/21 0951   Present on Hospital Admission: Yes  Wound Approximate Age at First Assessment (Weeks): 1 weeks  Primary Wound Type: Traumatic  Location: Foot  Wound Location Orientation: Dorsal; Left  Wound Elder. .. Wound Image    Wound Etiology Traumatic   Dressing Status Old drainage noted   Wound Length (cm) 0.2 cm   Wound Width (cm) 0.2 cm   Wound Depth (cm) 0.1 cm   Wound Surface Area (cm^2) 0.04 cm^2   Change in Wound Size % 96.19   Wound Volume (cm^3) 0.004 cm^3   Wound Healing % 96   Wound Assessment Pink/red   Drainage Amount Small   Drainage Description Serous   Wound Odor None   Izzy-Wound/Incision Assessment Non-Blanchable erythema   Edges Flat/open edges   Wound Thickness Description Full thickness   Wound Arm lower Left #1 11/08/21   Date First Assessed/Time First Assessed: 11/08/21 0944   Present on Hospital Admission: Yes  Wound Approximate Age at First Assessment (Weeks): 2 weeks  Primary Wound Type: Skin Tear  Location: Arm lower  Wound Location Orientation: Left  Wound Descri. ..    Wound Image    Wound Etiology Traumatic   Dressing Status Old drainage noted   Cleansed Cleansed with saline   Wound Length (cm) 1 cm   Wound Width (cm) 0.2 cm   Wound Depth (cm) 0.2 cm   Wound Surface Area (cm^2) 0.2 cm^2   Change in Wound Size % 99.58   Wound Volume (cm^3) 0.04 cm^3   Wound Healing % 99   Wound Assessment Pink/red;Granulation tissue   Drainage Amount Small   Drainage Description Serosanguinous   Wound Odor None   Izzy-Wound/Incision Assessment Intact   Edges Flat/open edges   Wound Thickness Description Full thickness       Visit Vitals  /61   Pulse 62   Temp 97.7 °F (36.5 °C)   Resp 16

## 2021-12-20 NOTE — DISCHARGE INSTRUCTIONS
Discharge Instructions/Wound Orders  [address], [address] [facility] [place], [id number]  Telephone: [phone]    NAME:  Charles Cuevas  YOB: 1926  MEDICAL RECORD NUMBER:  [id number]  DATE:  12/20/2021   Wound Care Orders:    Left upper arm, left forearm, left dorsal foot wounds    Clean wounds with normal saline, and pat dry. Lorean Snare Apply xeroform to all wounds, cover with gauze, rolled gauze, and tape. . may use gauze and tape on foot. Care to be done 3 x week     Dietary:  [x] Diet as tolerated: [] Calorie Diabetic Diet:Low carb and no Sugar [] No Added Salt:[] Increase Protein: [] Other:Limit the amount of liquid you are drinking and avoid drinking in between meals   Activity:  [x] Activity as tolerated:  [] Patient has no activity restrictions     [] Strict Bedrest: [] Remain off Work:     [] May return to full duty work:                                   [] Return to work with restrictions:             Return Appointment:   [x] Return Appointment: With Dr Althea Love in 2 weeks      [] Ordered tests:    Electronically signed Cary Teixeira RN on 12/20/2021 at 11:16 AM     Leidy Cardona 281: Should you experience any significant changes in your wound(s) or have questions about your wound care, please contact the [address] [place] [address] at [address] [place] [address] 8:00 am - 4:30. If you need help with your wound outside these hours and cannot wait until we are again available, contact your PCP or go to the hospital emergency room. PLEASE NOTE: IF YOU ARE UNABLE TO OBTAIN WOUND SUPPLIES, CONTINUE TO USE THE SUPPLIES YOU HAVE AVAILABLE UNTIL YOU ARE ABLE TO REACH US. IT IS MOST IMPORTANT TO KEEP THE WOUND COVERED AT ALL TIMES.      Physician Signature:_______________________    Date: ___________ Time:  ____________

## 2022-01-06 ENCOUNTER — HOSPITAL ENCOUNTER (OUTPATIENT)
Dept: WOUND CARE | Age: 87
Discharge: HOME | End: 2022-01-06
Payer: MEDICARE

## 2022-01-06 VITALS
HEART RATE: 60 BPM | SYSTOLIC BLOOD PRESSURE: 144 MMHG | TEMPERATURE: 97.7 F | RESPIRATION RATE: 16 BRPM | DIASTOLIC BLOOD PRESSURE: 58 MMHG

## 2022-01-06 DIAGNOSIS — S41.102D OPEN WOUND OF LEFT UPPER ARM, SUBSEQUENT ENCOUNTER: ICD-10-CM

## 2022-01-06 DIAGNOSIS — S51.802A OPEN WOUND OF LEFT FOREARM, INITIAL ENCOUNTER: ICD-10-CM

## 2022-01-06 PROBLEM — L03.116 CELLULITIS OF LEFT FOOT: Status: RESOLVED | Noted: 2021-12-13 | Resolved: 2022-01-06

## 2022-01-06 PROBLEM — S91.302A WOUND OF LEFT FOOT: Status: RESOLVED | Noted: 2021-12-06 | Resolved: 2022-01-06

## 2022-01-06 PROCEDURE — 99214 OFFICE O/P EST MOD 30 MIN: CPT | Performed by: SURGERY

## 2022-01-06 PROCEDURE — 99213 OFFICE O/P EST LOW 20 MIN: CPT

## 2022-01-06 NOTE — DISCHARGE INSTRUCTIONS
Discharge Instructions/Wound Orders  [address], [address] [place] [address] [place], [id number]  Telephone: [phone]    NAME:  Michael Roth  YOB: 1926  MEDICAL RECORD NUMBER:  [id number]  DATE:  1/6/2022  Wound Care Orders:  Left upper arm, left elbow    Clean wounds with normal saline, and pat dry. Nasrin Ventura Apply xeroform to all wounds, cover with gauze, rolled gauze, and tape. . may use gauze and tape on foot. Care to be done 3 x week     Return to see MD in 2 weeks      Dietary:  [x] Diet as tolerated: [] Calorie Diabetic Diet:Low carb and no Sugar [] No Added Salt:[] Increase Protein: [] Other:Limit the amount of liquid you are drinking and avoid drinking in between meals   Activity:  [x] Activity as tolerated:  [] Patient has no activity restrictions     [] Strict Bedrest: [] Remain off Work:     [] May return to full duty work:                                   [] Return to work with restrictions:             Return Appointment:   [x] Return Appointment: With DR Danish Marin  in  2 Week(s)  [] Ordered tests:    Electronically signed Leesa Robertson RN on 1/6/2022 at 12:04 PM     Leidy Cardona 281: Should you experience any significant changes in your wound(s) or have questions about your wound care, please contact the [address] [place] [address] at [address] [place] [address] 8:00 am - 4:30. If you need help with your wound outside these hours and cannot wait until we are again available, contact your PCP or go to the hospital emergency room. PLEASE NOTE: IF YOU ARE UNABLE TO OBTAIN WOUND SUPPLIES, CONTINUE TO USE THE SUPPLIES YOU HAVE AVAILABLE UNTIL YOU ARE ABLE TO REACH US. IT IS MOST IMPORTANT TO KEEP THE WOUND COVERED AT ALL TIMES.      Physician Signature:_______________________    Date: ___________ Time:  ____________

## 2022-01-06 NOTE — WOUND CARE
01/06/22 1142   Wound Elbow Left # 4 01/06/22   Date First Assessed/Time First Assessed: 01/06/22 1152   Present on Hospital Admission: Yes  Wound Approximate Age at First Assessment (Weeks): (c)   Primary Wound Type: Traumatic  Location: Elbow  Wound Location Orientation: Left  Wound Description: . .. Wound Image    Wound Etiology Traumatic   Cleansed Cleansed with saline   Wound Length (cm) 4.5 cm  (skin tear)   Wound Width (cm) 1 cm   Wound Depth (cm) 0.1 cm   Wound Surface Area (cm^2) 4.5 cm^2   Wound Volume (cm^3) 0.45 cm^3   Wound Assessment Pink/red   Drainage Amount Moderate   Drainage Description Serosanguinous   Wound Odor None   Izzy-Wound/Incision Assessment Ecchymosis   Edges Attached edges; Unattached edges   Wound Arm upper Left # 2 12/06/21   Date First Assessed/Time First Assessed: 12/06/21 0947   Present on Hospital Admission: Yes  Wound Approximate Age at First Assessment (Weeks): 1 weeks  Primary Wound Type: (c) Skin Tear  Location: Arm upper  Wound Location Orientation: Left  Wound De. .. Wound Image    Wound Length (cm) 0.2 cm   Wound Width (cm) 0.5 cm   Wound Depth (cm) 0.1 cm   Wound Surface Area (cm^2) 0.1 cm^2   Change in Wound Size % 99.74   Wound Volume (cm^3) 0.01 cm^3   Wound Healing % 100   Wound Assessment Pink/red   Drainage Amount Scant   Drainage Description Serosanguinous   Wound Odor None   Wound Foot Dorsal; Left # 3 12/06/21   Date First Assessed/Time First Assessed: 12/06/21 0951   Present on Hospital Admission: Yes  Wound Approximate Age at First Assessment (Weeks): 1 weeks  Primary Wound Type: Traumatic  Location: Foot  Wound Location Orientation: Dorsal; Left  Wound Elder. ..    Wound Image    Wound Etiology Traumatic   Wound Length (cm) 0.1 cm   Wound Width (cm) 0.1 cm   Wound Depth (cm) 0.1 cm   Wound Surface Area (cm^2) 0.01 cm^2   Change in Wound Size % 99.05   Wound Volume (cm^3) 0.001 cm^3   Wound Healing % 99   Wound Assessment   (scab)   Drainage Amount None   Wound Odor None   Wound Arm lower Left #1 11/08/21   Date First Assessed/Time First Assessed: 11/08/21 0944   Present on Hospital Admission: Yes  Wound Approximate Age at First Assessment (Weeks): 2 weeks  Primary Wound Type: Skin Tear  Location: Arm lower  Wound Location Orientation: Left  Wound Descri. ..    Wound Image    Wound Etiology Traumatic   Wound Length (cm) 0 cm   Wound Width (cm) 0 cm   Wound Depth (cm) 0 cm   Wound Surface Area (cm^2) 0 cm^2   Change in Wound Size % 100   Wound Volume (cm^3) 0 cm^3   Wound Healing % 100   Drainage Amount None   Wound Odor None   Pain 1   Pain Scale 1 Numeric (0 - 10)   Pain Intensity 1 2   Patient Stated Pain Goal 0   Pain Reassessment 1 Yes   Pain Location 1 Neck   Pain Orientation 1 Left   Pain Description 1 Aching     Visit Vitals  BP (!) 144/58   Pulse 60   Temp 97.7 °F (36.5 °C)   Resp 16

## 2022-01-06 NOTE — WOUND CARE
01/06/22 1206   Wound Elbow Left # 4 01/06/22   Date First Assessed/Time First Assessed: 01/06/22 1152   Present on Hospital Admission: Yes  Wound Approximate Age at First Assessment (Weeks): (c)   Primary Wound Type: Traumatic  Location: Elbow  Wound Location Orientation: Left  Wound Description: . .. Dressing Status New dressing applied   Dressing/Treatment Xeroform;ABD pad;Roll gauze;Tape/Soft cloth adhesive tape   Wound Arm upper Left # 2 12/06/21   Date First Assessed/Time First Assessed: 12/06/21 0947   Present on Hospital Admission: Yes  Wound Approximate Age at First Assessment (Weeks): 1 weeks  Primary Wound Type: (c) Skin Tear  Location: Arm upper  Wound Location Orientation: Left  Wound De. .. Dressing Status New dressing applied   Dressing/Treatment Xeroform;Gauze dressing/dressing sponge;Roll gauze;Tape/Soft cloth adhesive tape   Wound Foot Dorsal; Left # 3 12/06/21   Date First Assessed/Time First Assessed: 12/06/21 0951   Present on Hospital Admission: Yes  Wound Approximate Age at First Assessment (Weeks): 1 weeks  Primary Wound Type: Traumatic  Location: Foot  Wound Location Orientation: Dorsal; Left  Wound Elder. .. Dressing/Treatment Open to air   Wound Arm lower Left #1 11/08/21   Date First Assessed/Time First Assessed: 11/08/21 0944   Present on Hospital Admission: Yes  Wound Approximate Age at First Assessment (Weeks): 2 weeks  Primary Wound Type: Skin Tear  Location: Arm lower  Wound Location Orientation: Left  Wound Descri. ..    Dressing/Treatment Open to air

## 2022-01-06 NOTE — PROGRESS NOTES
HISTORY OF PRESENT ILLNESS:  The patient is a 19-year-old man who is referred to the [address] [place] [address] Road regarding a wound on his left forearm.  The patient reports several weeks ago he fell and had a laceration on the left forearm.  He went to the ER at that time and then later went to an [facility] has completed a course of antibiotics.     The patient was first seen at [facility] on 11/8/2021. He has new laceration related to another fall.     Foot site he reports is healed.     The patient lives in [address] [place] [address] is ambulatory but says he always uses a walker because of poor balance.     On 12/8/2021, I discussed with patient's son, Antonio Johnson., my concerns about patient's ability to continue living in independent living.  The patient has had multiple falls.  The son and the patient are now assessing a move to assisted living.     The patient has history of diabetes which is diet controlled. Alejandra Holt has history of pacemaker and history of aortic valve replacement.  He has no history of coronary artery disease or MI or coronary intervention.     He takes lasix 40 mg daily.  Drinks extra water during the day.     The patient denies shortness of breath.  He has history of arthritis. Alejandra Holt is a former smoker having quit in [address] is a [employer] .     Culture of 12/6/2021 grew MSSA, sens to TCN.  Patient started emperic DCN on 12/6/2021.     Dressing ordered 11/8/2021:  Thin Mepilex over wound covered by ABD and roll gauze with elastic sleeve to help hold dressing in place.  Dressing needs to be changed 3 times per week by home health nurses.     Dressings as of 12/6/2021:  Xeroform over all wounds, covered by ABD and roll gauze with elastic sleeve to help hold dressing in place.  Dressing needs to be changed 3 times per week by home health nurses.              Reported weight 131 pounds, height 5 feet 11 inches.     PHYSICAL EXAMINATION:     Alert elderly man, NAD. EXTREMITIES:  Examination of the left upper extremity revealed on the left forearm a healed site. The patient's skin is relatively thin. New skin tear / laceration on the posterior forearm / elbow 4.5 x 1 x 0.1 cm with pink surface.      Left upper medial arm - avulsion of skin with wound 0.1 x 0.1 x 0.1 cm. Scab.     Left foot - palpable left DP pulse.  Trace to 1 + edema left lower leg and foot.  Wound on distal dorsal foot is healed.           Previous wounds healed. New laceration left forearm from fall.            Discussed issue of frequent falls. He should go to assisted living.     Dressing Ordered:  Xeroform over wound, covered by ABD and roll gauze with elastic sleeve to help hold dressing in place.  Dressing needs to be changed 3 times per week by home health nurses.     Resume lasix 40 mg daily.  Reduce extra water intake.     The patient will follow up in the [address] [place] [address] in 2 weeks.     FINAL DIAGNOSIS:  Traumatic wound, left forearm.  Poor balance . Bilateral leg edema.            S58.572D, R60.0        Shannan Mukherjee MD

## 2022-01-07 PROBLEM — S41.102A OPEN WOUND OF LEFT UPPER ARM: Status: RESOLVED | Noted: 2021-12-06 | Resolved: 2022-01-07

## 2022-01-20 ENCOUNTER — HOSPITAL ENCOUNTER (OUTPATIENT)
Dept: WOUND CARE | Age: 87
Discharge: HOME | End: 2022-01-20
Payer: MEDICARE

## 2022-01-20 VITALS
TEMPERATURE: 98.1 F | DIASTOLIC BLOOD PRESSURE: 86 MMHG | HEART RATE: 63 BPM | RESPIRATION RATE: 16 BRPM | SYSTOLIC BLOOD PRESSURE: 137 MMHG

## 2022-01-20 DIAGNOSIS — S51.802A OPEN WOUND OF LEFT FOREARM, INITIAL ENCOUNTER: ICD-10-CM

## 2022-01-20 PROCEDURE — 99212 OFFICE O/P EST SF 10 MIN: CPT

## 2022-01-20 PROCEDURE — 99213 OFFICE O/P EST LOW 20 MIN: CPT | Performed by: SURGERY

## 2022-01-20 NOTE — DISCHARGE INSTRUCTIONS
Discharge Instructions/Wound Orders  [address], [address] [place] [address] [place], [id number]  Telephone: [phone]    NAME:  Kathleen Eddy  YOB: 1926  MEDICAL RECORD NUMBER:  [id number]  DATE:  1/20/2022     Wound Care Orders:    Left arm wound. . Continue wound care until scab has fallen off on it's own. Clean with normal saline, pat dry. Hanna Abarca Apply xeroform, gauze and rolled gauze or border foam..   If wound has not completely healed in 3 weeks, contact wound center for follow up appointment. Dietary:  [] Diet as tolerated: [] Calorie Diabetic Diet:Low carb and no Sugar [] No Added Salt:[] Increase Protein: [] Other:Limit the amount of liquid you are drinking and avoid drinking in between meals   Activity:  [] Activity as tolerated:  [] Patient has no activity restrictions     [] Strict Bedrest: [] Remain off Work:     [] May return to full duty work:                                   [] Return to work with restrictions:             Return Appointment:   [x] Return Appointment: With Dr Chrystal Mar if wound has not healed completely in 3 weeks  [] Ordered tests:    Electronically signed Van Zvaala RN on 1/20/2022 at [address] [place] [address]: Should you experience any significant changes in your wound(s) or have questions about your wound care, please contact the [address] [place] [address] at [address] [place] [address] 8:00 am - 4:30. If you need help with your wound outside these hours and cannot wait until we are again available, contact your PCP or go to the hospital emergency room. PLEASE NOTE: IF YOU ARE UNABLE TO OBTAIN WOUND SUPPLIES, CONTINUE TO USE THE SUPPLIES YOU HAVE AVAILABLE UNTIL YOU ARE ABLE TO REACH US. IT IS MOST IMPORTANT TO KEEP THE WOUND COVERED AT ALL TIMES.      Physician Signature:_______________________    Date: ___________ Time:  ____________

## 2022-01-20 NOTE — PROGRESS NOTES
HISTORY OF PRESENT ILLNESS:  The patient is a 70-year-old man who was referred to the [address] [place] [address] Road regarding a wound on his left forearm.  The patient reports several weeks ago he fell and had a laceration on the left forearm.  He went to the ER at that time and then later went to an [facility] has completed a course of antibiotics.     The patient was first seen at [facility] on 11/8/2021.     No new falls.     Foot site he reports is healed.     The patient lives in [address] [place] [address] is ambulatory but says he always uses a walker because of poor balance.     On 12/8/2021, I discussed with patient's son, Luis Fields, my concerns about patient's ability to continue living in independent living.  The patient has had multiple falls.  The son and the patient are now assessing a move to assisted living.     The patient has history of diabetes which is diet controlled. [facility] has history of pacemaker and history of aortic valve replacement.  He has no history of coronary artery disease or MI or coronary intervention.     He takes lasix 40 mg daily.  Drinks extra water during the day.     The patient denies shortness of breath.  He has history of arthritis. [facility] is a former smoker having quit in [address] is a [employer] .     Culture of 12/6/2021 grew MSSA, sens to TCN.  Patient started emperic DCN on 12/6/2021.     Dressing ordered 11/8/2021:  Thin Mepilex over wound covered by ABD and roll gauze with elastic sleeve to help hold dressing in place.  Dressing needs to be changed 3 times per week by home health nurses.     Dressings as of 12/6/2021:  Xeroform over all wounds, covered by ABD and roll gauze with elastic sleeve to help hold dressing in place.  Dressing needs to be changed 3 times per week by home health nurses.              Reported weight 131 pounds, height 5 feet 11 inches.     PHYSICAL EXAMINATION:     Alert elderly man, NAD.        EXTREMITIES:  Examination of the left upper extremity revealed on the left forearm a healed site. The patient's skin is relatively thin. Scabbed site posterior left forearm.      Left upper medial arm - site healed.              One remaining site of wound left arm is now scabbed.            Discussed issue of frequent falls. He should go to assisted living.     Dressing Ordered:  Xeroform over wound, covered by ABD and roll gauze with elastic sleeve to help hold dressing in place.  Dressing needs to be changed 3 times per week by home health nurses. No scheduled appointment  -  See us in 3 weeks if site has failed to heal.            FINAL DIAGNOSIS:  Traumatic wound, left forearm.  Poor balance . Bilateral leg edema.             S51.802D, R60.0        Marley Kelly MD

## 2022-01-20 NOTE — WOUND CARE
01/20/22 0958   Wound Arm upper Left # 2 12/06/21   Date First Assessed/Time First Assessed: 12/06/21 0947   Present on Hospital Admission: Yes  Wound Approximate Age at First Assessment (Weeks): 1 weeks  Primary Wound Type: (c) Skin Tear  Location: Arm upper  Wound Location Orientation: Left  Wound De. .. Wound Image    Wound Length (cm) 0 cm   Wound Width (cm) 0 cm   Wound Depth (cm) 0 cm   Wound Surface Area (cm^2) 0 cm^2   Change in Wound Size % 100   Wound Volume (cm^3) 0 cm^3   Wound Healing % 100   Wound Assessment   (well approximated)   Drainage Amount None   Wound Odor None   Wound Thickness Description   (appears to  be healed)   Wound Elbow Left # 4 01/06/22   Date First Assessed/Time First Assessed: 01/06/22 1152   Present on Hospital Admission: Yes  Wound Approximate Age at First Assessment (Weeks): (c)   Primary Wound Type: Traumatic  Location: Elbow  Wound Location Orientation: Left  Wound Description: . ..    Wound Image    Wound Etiology Traumatic   Cleansed Cleansed with saline   Wound Length (cm) 2 cm   Wound Width (cm) 1 cm   Wound Depth (cm) 1 cm   Wound Surface Area (cm^2) 2 cm^2   Change in Wound Size % 55.56   Wound Volume (cm^3) 2 cm^3   Wound Healing % -344   Drainage Amount None   Wound Odor None   Wound Thickness Description   (scabbed)     Visit Vitals  /86 (BP Patient Position: At rest)   Pulse 63   Temp 98.1 °F (36.7 °C)   Resp 16